# Patient Record
Sex: FEMALE | Race: WHITE | NOT HISPANIC OR LATINO | Employment: FULL TIME | URBAN - NONMETROPOLITAN AREA
[De-identification: names, ages, dates, MRNs, and addresses within clinical notes are randomized per-mention and may not be internally consistent; named-entity substitution may affect disease eponyms.]

---

## 2024-06-18 ENCOUNTER — APPOINTMENT (OUTPATIENT)
Dept: CT IMAGING | Facility: HOSPITAL | Age: 54
End: 2024-06-18
Payer: COMMERCIAL

## 2024-06-18 ENCOUNTER — HOSPITAL ENCOUNTER (EMERGENCY)
Facility: HOSPITAL | Age: 54
Discharge: HOME | End: 2024-06-18
Attending: STUDENT IN AN ORGANIZED HEALTH CARE EDUCATION/TRAINING PROGRAM
Payer: COMMERCIAL

## 2024-06-18 VITALS
DIASTOLIC BLOOD PRESSURE: 76 MMHG | TEMPERATURE: 98 F | WEIGHT: 168 LBS | SYSTOLIC BLOOD PRESSURE: 110 MMHG | BODY MASS INDEX: 29.77 KG/M2 | RESPIRATION RATE: 16 BRPM | OXYGEN SATURATION: 98 % | HEART RATE: 71 BPM | HEIGHT: 63 IN

## 2024-06-18 DIAGNOSIS — N23 RENAL COLIC, BILATERAL: Primary | ICD-10-CM

## 2024-06-18 LAB
ALBUMIN SERPL-MCNC: 3.6 G/DL (ref 3.5–5.2)
ALBUMIN/GLOB SERPL: 1.3 G/DL
ALP SERPL-CCNC: 62 U/L (ref 39–117)
ALT SERPL W P-5'-P-CCNC: 29 U/L (ref 1–33)
ANION GAP SERPL CALCULATED.3IONS-SCNC: 7.9 MMOL/L (ref 5–15)
AST SERPL-CCNC: 29 U/L (ref 1–32)
BACTERIA UR QL AUTO: ABNORMAL /HPF
BASOPHILS # BLD AUTO: 0.03 10*3/MM3 (ref 0–0.2)
BASOPHILS NFR BLD AUTO: 0.5 % (ref 0–1.5)
BILIRUB SERPL-MCNC: 0.4 MG/DL (ref 0–1.2)
BILIRUB UR QL STRIP: ABNORMAL
BUN SERPL-MCNC: 11 MG/DL (ref 6–20)
BUN/CREAT SERPL: 12.5 (ref 7–25)
CALCIUM SPEC-SCNC: 9.4 MG/DL (ref 8.6–10.5)
CHLORIDE SERPL-SCNC: 109 MMOL/L (ref 98–107)
CLARITY UR: CLEAR
CO2 SERPL-SCNC: 25.1 MMOL/L (ref 22–29)
COLOR UR: ABNORMAL
CREAT SERPL-MCNC: 0.88 MG/DL (ref 0.57–1)
CRP SERPL-MCNC: <0.3 MG/DL (ref 0–0.5)
D-LACTATE SERPL-SCNC: 0.8 MMOL/L (ref 0.5–2)
DEPRECATED RDW RBC AUTO: 44.9 FL (ref 37–54)
EGFRCR SERPLBLD CKD-EPI 2021: 78.7 ML/MIN/1.73
EOSINOPHIL # BLD AUTO: 0.07 10*3/MM3 (ref 0–0.4)
EOSINOPHIL NFR BLD AUTO: 1.2 % (ref 0.3–6.2)
ERYTHROCYTE [DISTWIDTH] IN BLOOD BY AUTOMATED COUNT: 12.6 % (ref 12.3–15.4)
ERYTHROCYTE [SEDIMENTATION RATE] IN BLOOD: 2 MM/HR (ref 0–30)
GLOBULIN UR ELPH-MCNC: 2.7 GM/DL
GLUCOSE SERPL-MCNC: 97 MG/DL (ref 65–99)
GLUCOSE UR STRIP-MCNC: NEGATIVE MG/DL
HCT VFR BLD AUTO: 46.7 % (ref 34–46.6)
HGB BLD-MCNC: 15 G/DL (ref 12–15.9)
HGB UR QL STRIP.AUTO: NEGATIVE
HOLD SPECIMEN: NORMAL
HOLD SPECIMEN: NORMAL
HYALINE CASTS UR QL AUTO: ABNORMAL /LPF
IMM GRANULOCYTES # BLD AUTO: 0.01 10*3/MM3 (ref 0–0.05)
IMM GRANULOCYTES NFR BLD AUTO: 0.2 % (ref 0–0.5)
KETONES UR QL STRIP: NEGATIVE
LEUKOCYTE ESTERASE UR QL STRIP.AUTO: ABNORMAL
LYMPHOCYTES # BLD AUTO: 1.77 10*3/MM3 (ref 0.7–3.1)
LYMPHOCYTES NFR BLD AUTO: 31.5 % (ref 19.6–45.3)
MCH RBC QN AUTO: 31.1 PG (ref 26.6–33)
MCHC RBC AUTO-ENTMCNC: 32.1 G/DL (ref 31.5–35.7)
MCV RBC AUTO: 96.7 FL (ref 79–97)
MONOCYTES # BLD AUTO: 0.52 10*3/MM3 (ref 0.1–0.9)
MONOCYTES NFR BLD AUTO: 9.3 % (ref 5–12)
NEUTROPHILS NFR BLD AUTO: 3.22 10*3/MM3 (ref 1.7–7)
NEUTROPHILS NFR BLD AUTO: 57.3 % (ref 42.7–76)
NITRITE UR QL STRIP: POSITIVE
NRBC BLD AUTO-RTO: 0 /100 WBC (ref 0–0.2)
PH UR STRIP.AUTO: 5.5 [PH] (ref 5–8)
PLATELET # BLD AUTO: 212 10*3/MM3 (ref 140–450)
PMV BLD AUTO: 9.8 FL (ref 6–12)
POTASSIUM SERPL-SCNC: 3.9 MMOL/L (ref 3.5–5.2)
PROT SERPL-MCNC: 6.3 G/DL (ref 6–8.5)
PROT UR QL STRIP: ABNORMAL
RBC # BLD AUTO: 4.83 10*6/MM3 (ref 3.77–5.28)
RBC # UR STRIP: ABNORMAL /HPF
REF LAB TEST METHOD: ABNORMAL
SODIUM SERPL-SCNC: 142 MMOL/L (ref 136–145)
SP GR UR STRIP: 1.01 (ref 1–1.03)
SQUAMOUS #/AREA URNS HPF: ABNORMAL /HPF
UROBILINOGEN UR QL STRIP: ABNORMAL
WBC # UR STRIP: ABNORMAL /HPF
WBC NRBC COR # BLD AUTO: 5.62 10*3/MM3 (ref 3.4–10.8)
WHOLE BLOOD HOLD COAG: NORMAL
WHOLE BLOOD HOLD SPECIMEN: NORMAL

## 2024-06-18 PROCEDURE — 80053 COMPREHEN METABOLIC PANEL: CPT

## 2024-06-18 PROCEDURE — 96375 TX/PRO/DX INJ NEW DRUG ADDON: CPT

## 2024-06-18 PROCEDURE — 25810000003 SODIUM CHLORIDE 0.9 % SOLUTION

## 2024-06-18 PROCEDURE — 74176 CT ABD & PELVIS W/O CONTRAST: CPT

## 2024-06-18 PROCEDURE — 74176 CT ABD & PELVIS W/O CONTRAST: CPT | Performed by: RADIOLOGY

## 2024-06-18 PROCEDURE — 36415 COLL VENOUS BLD VENIPUNCTURE: CPT

## 2024-06-18 PROCEDURE — 81001 URINALYSIS AUTO W/SCOPE: CPT

## 2024-06-18 PROCEDURE — 85025 COMPLETE CBC W/AUTO DIFF WBC: CPT

## 2024-06-18 PROCEDURE — 99284 EMERGENCY DEPT VISIT MOD MDM: CPT

## 2024-06-18 PROCEDURE — 96374 THER/PROPH/DIAG INJ IV PUSH: CPT

## 2024-06-18 PROCEDURE — 25010000002 KETOROLAC TROMETHAMINE PER 15 MG

## 2024-06-18 PROCEDURE — 85652 RBC SED RATE AUTOMATED: CPT

## 2024-06-18 PROCEDURE — 83605 ASSAY OF LACTIC ACID: CPT

## 2024-06-18 PROCEDURE — 25010000002 ONDANSETRON PER 1 MG

## 2024-06-18 PROCEDURE — 86140 C-REACTIVE PROTEIN: CPT

## 2024-06-18 RX ORDER — ONDANSETRON 2 MG/ML
4 INJECTION INTRAMUSCULAR; INTRAVENOUS ONCE
Status: COMPLETED | OUTPATIENT
Start: 2024-06-18 | End: 2024-06-18

## 2024-06-18 RX ORDER — SODIUM CHLORIDE 0.9 % (FLUSH) 0.9 %
10 SYRINGE (ML) INJECTION AS NEEDED
Status: DISCONTINUED | OUTPATIENT
Start: 2024-06-18 | End: 2024-06-18 | Stop reason: HOSPADM

## 2024-06-18 RX ORDER — KETOROLAC TROMETHAMINE 30 MG/ML
30 INJECTION, SOLUTION INTRAMUSCULAR; INTRAVENOUS ONCE
Status: COMPLETED | OUTPATIENT
Start: 2024-06-18 | End: 2024-06-18

## 2024-06-18 RX ORDER — HYDROCODONE BITARTRATE AND ACETAMINOPHEN 5; 325 MG/1; MG/1
1 TABLET ORAL ONCE
Status: COMPLETED | OUTPATIENT
Start: 2024-06-18 | End: 2024-06-18

## 2024-06-18 RX ORDER — HYDROCODONE BITARTRATE AND ACETAMINOPHEN 5; 325 MG/1; MG/1
1 TABLET ORAL EVERY 8 HOURS PRN
Qty: 6 TABLET | Refills: 0 | Status: SHIPPED | OUTPATIENT
Start: 2024-06-18 | End: 2024-06-18 | Stop reason: SDUPTHER

## 2024-06-18 RX ORDER — OXYCODONE HYDROCHLORIDE AND ACETAMINOPHEN 5; 325 MG/1; MG/1
1 TABLET ORAL EVERY 6 HOURS PRN
Qty: 12 TABLET | Refills: 0 | Status: SHIPPED | OUTPATIENT
Start: 2024-06-18 | End: 2024-06-21

## 2024-06-18 RX ORDER — HYDROCODONE BITARTRATE AND ACETAMINOPHEN 5; 325 MG/1; MG/1
1 TABLET ORAL EVERY 8 HOURS PRN
Qty: 6 TABLET | Refills: 0 | Status: CANCELLED | OUTPATIENT
Start: 2024-06-18

## 2024-06-18 RX ADMIN — SODIUM CHLORIDE 1000 ML: 9 INJECTION, SOLUTION INTRAVENOUS at 16:36

## 2024-06-18 RX ADMIN — ONDANSETRON 4 MG: 2 INJECTION INTRAMUSCULAR; INTRAVENOUS at 16:34

## 2024-06-18 RX ADMIN — KETOROLAC TROMETHAMINE 30 MG: 30 INJECTION, SOLUTION INTRAMUSCULAR; INTRAVENOUS at 16:35

## 2024-06-18 RX ADMIN — HYDROCODONE BITARTRATE AND ACETAMINOPHEN 1 TABLET: 5; 325 TABLET ORAL at 18:21

## 2024-06-18 NOTE — ED PROVIDER NOTES
Subjective   History of Present Illness  Patient is a 54-year-old female with no significant past medical history.  Patient presents with complaints of right-sided flank pain that began Saturday with radiation to now the left side.  Patient reports pain began higher on her flank and has now radiated to lower hips.  Patient reports nausea but denies any vomiting, abdominal pain, or any fever.  Patient reports she does have a history of kidney stones.  [facility] with [facility].  Patient reports that she was diagnosed based on the fact that she had blood in her urine.  Patient is alert and oriented to all questions appropriately.  Patient presents private vehicle with family at bedside.        Review of Systems   Constitutional: Negative.  Negative for fever.   HENT: Negative.     Respiratory: Negative.     Cardiovascular: Negative.  Negative for chest pain.   Gastrointestinal: Negative.  Negative for abdominal pain.   Endocrine: Negative.    Genitourinary:  Positive for flank pain. Negative for dysuria.   Skin: Negative.    Neurological: Negative.    Psychiatric/Behavioral: Negative.     All other systems reviewed and are negative.      No past medical history on file.    Allergies   Allergen Reactions    Avelox [Moxifloxacin] Hives    Morphine Rash and GI Intolerance    Synthroid [Levothyroxine] Rash       No past surgical history on file.    No family history on file.    Social History     Socioeconomic History    Marital status:            Objective   Physical Exam  Vitals and nursing note reviewed.   Constitutional:       General: She is not in acute distress.     Appearance: She is well-developed. She is not diaphoretic.   HENT:      Head: Normocephalic and atraumatic.      Right Ear: External ear normal.      Left Ear: External ear normal.      Nose: Nose normal.   Eyes:      Conjunctiva/sclera: Conjunctivae normal.      Pupils: Pupils are  equal, round, and reactive to light.   Neck:      Vascular: No JVD.      Trachea: No tracheal deviation.   Cardiovascular:      Rate and Rhythm: Normal rate and regular rhythm.      Heart sounds: Normal heart sounds. No murmur heard.  Pulmonary:      Effort: Pulmonary effort is normal. No respiratory distress.      Breath sounds: Normal breath sounds. No wheezing.   Abdominal:      General: Bowel sounds are normal.      Palpations: Abdomen is soft.      Tenderness: There is no abdominal tenderness. There is right CVA tenderness and left CVA tenderness.   Musculoskeletal:         General: No deformity. Normal range of motion.      Cervical back: Normal range of motion and neck supple.   Skin:     General: Skin is warm and dry.      Coloration: Skin is not pale.      Findings: No erythema or rash.   Neurological:      Mental Status: She is alert and oriented to person, place, and time.      Cranial Nerves: No cranial nerve deficit.   Psychiatric:         Behavior: Behavior normal.         Thought Content: Thought content normal.       Results for orders placed or performed during the hospital encounter of 06/18/24   Comprehensive Metabolic Panel    Specimen: Blood   Result Value Ref Range    Glucose 97 65 - 99 mg/dL    BUN 11 6 - 20 mg/dL    Creatinine 0.88 0.57 - 1.00 mg/dL    Sodium 142 136 - 145 mmol/L    Potassium 3.9 3.5 - 5.2 mmol/L    Chloride 109 (H) 98 - 107 mmol/L    CO2 25.1 22.0 - 29.0 mmol/L    Calcium 9.4 8.6 - 10.5 mg/dL    Total Protein 6.3 6.0 - 8.5 g/dL    Albumin 3.6 3.5 - 5.2 g/dL    ALT (SGPT) 29 1 - 33 U/L    AST (SGOT) 29 1 - 32 U/L    Alkaline Phosphatase 62 39 - 117 U/L    Total Bilirubin 0.4 0.0 - 1.2 mg/dL    Globulin 2.7 gm/dL    A/G Ratio 1.3 g/dL    BUN/Creatinine Ratio 12.5 7.0 - 25.0    Anion Gap 7.9 5.0 - 15.0 mmol/L    eGFR 78.7 >60.0 mL/min/1.73   Urinalysis With Culture If Indicated - Urine, Clean Catch    Specimen: Urine, Clean Catch   Result Value Ref Range    Color, UA Orange (A)  Yellow, Straw    Appearance, UA Clear Clear    pH, UA 5.5 5.0 - 8.0    Specific Gravity, UA 1.009 1.005 - 1.030    Glucose, UA Negative Negative    Ketones, UA Negative Negative    Bilirubin, UA Small (1+) (A) Negative    Blood, UA Negative Negative    Protein, UA Trace (A) Negative    Leuk Esterase, UA Small (1+) (A) Negative    Nitrite, UA Positive (A) Negative    Urobilinogen, UA 1.0 E.U./dL 0.2 - 1.0 E.U./dL   Lactic Acid, Plasma    Specimen: Blood   Result Value Ref Range    Lactate 0.8 0.5 - 2.0 mmol/L   C-reactive Protein    Specimen: Blood   Result Value Ref Range    C-Reactive Protein <0.30 0.00 - 0.50 mg/dL   Sedimentation Rate    Specimen: Blood   Result Value Ref Range    Sed Rate 2 0 - 30 mm/hr   CBC Auto Differential    Specimen: Blood   Result Value Ref Range    WBC 5.62 3.40 - 10.80 10*3/mm3    RBC 4.83 3.77 - 5.28 10*6/mm3    Hemoglobin 15.0 12.0 - 15.9 g/dL    Hematocrit 46.7 (H) 34.0 - 46.6 %    MCV 96.7 79.0 - 97.0 fL    MCH 31.1 26.6 - 33.0 pg    MCHC 32.1 31.5 - 35.7 g/dL    RDW 12.6 12.3 - 15.4 %    RDW-SD 44.9 37.0 - 54.0 fl    MPV 9.8 6.0 - 12.0 fL    Platelets 212 140 - 450 10*3/mm3    Neutrophil % 57.3 42.7 - 76.0 %    Lymphocyte % 31.5 19.6 - 45.3 %    Monocyte % 9.3 5.0 - 12.0 %    Eosinophil % 1.2 0.3 - 6.2 %    Basophil % 0.5 0.0 - 1.5 %    Immature Grans % 0.2 0.0 - 0.5 %    Neutrophils, Absolute 3.22 1.70 - 7.00 10*3/mm3    Lymphocytes, Absolute 1.77 0.70 - 3.10 10*3/mm3    Monocytes, Absolute 0.52 0.10 - 0.90 10*3/mm3    Eosinophils, Absolute 0.07 0.00 - 0.40 10*3/mm3    Basophils, Absolute 0.03 0.00 - 0.20 10*3/mm3    Immature Grans, Absolute 0.01 0.00 - 0.05 10*3/mm3    nRBC 0.0 0.0 - 0.2 /100 WBC   Urinalysis, Microscopic Only - Urine, Clean Catch    Specimen: Urine, Clean Catch   Result Value Ref Range    RBC, UA 0-2 None Seen, 0-2 /HPF    WBC, UA 3-5 (A) None Seen, 0-2 /HPF    Bacteria, UA None Seen None Seen /HPF    Squamous Epithelial Cells, UA 0-2 None Seen, 0-2 /HPF     Hyaline Casts, UA None Seen None Seen /LPF    Methodology Automated Microscopy    Green Top (Gel)   Result Value Ref Range    Extra Tube Hold for add-ons.    Lavender Top   Result Value Ref Range    Extra Tube hold for add-on    Gold Top - SST   Result Value Ref Range    Extra Tube Hold for add-ons.    Light Blue Top   Result Value Ref Range    Extra Tube Hold for add-ons.      CT Abdomen Pelvis Stone Protocol    Result Date: 6/18/2024  Bilateral calyceal stones.  No hydronephrosis.     [id number]  ZIP Code [address].        This report was finalized on 6/18/2024 5:53 PM by Dr. Syeda Gaona MD.         Procedures           ED Course                                             Medical Decision Making  Patient is a 54-year-old female with no significant past medical history.  Patient presents with complaints of right-sided flank pain that began Saturday with radiation to now the left side.  Patient reports pain began higher on her flank and has now radiated to lower hips.  Patient reports nausea but denies any vomiting, abdominal pain, or any fever.  Patient reports she does have a history of kidney stones.  [facility] and [facility] with [facility].  Patient reports that she was diagnosed based on the fact that she had blood in her urine.  Patient is alert and oriented to all questions appropriately.  Patient presents private vehicle with family at bedside.    Amount and/or Complexity of Data Reviewed  Labs: ordered.  Radiology: ordered.    Risk  Prescription drug management.        Final diagnoses:   Renal colic, bilateral       ED Disposition  ED Disposition       ED Disposition   Discharge    Condition   Stable    Comment   --               Kenneth Hopkins, ADAN  [address] [address] [address]  [phone]    In 1 day  Follow-up at scheduled appointment on 6/19 at 930         Medication List        New Prescriptions      oxyCODONE-acetaminophen 5-325 MG per  tablet  Commonly known as: PERCOCET  Take 1 tablet by mouth Every 6 (Six) Hours As Needed for Severe Pain for up to 3 days.               Where to Get Your Medications        These medications were sent to [id number] - [place], [place] - [address] AT [facility] - [phone] [facility] [phone] FX  [address], [place] [facility] [address]      Phone: [phone]   oxyCODONE-acetaminophen 5-325 MG per tablet            Taya Nelson, APRN  06/18/24 1314

## 2024-06-19 ENCOUNTER — OFFICE VISIT (OUTPATIENT)
Dept: UROLOGY | Facility: CLINIC | Age: 54
End: 2024-06-19
Payer: COMMERCIAL

## 2024-06-19 VITALS
WEIGHT: 176.8 LBS | SYSTOLIC BLOOD PRESSURE: 123 MMHG | DIASTOLIC BLOOD PRESSURE: 71 MMHG | HEART RATE: 76 BPM | HEIGHT: 63 IN | BODY MASS INDEX: 31.33 KG/M2

## 2024-06-19 DIAGNOSIS — N20.0 BILATERAL KIDNEY STONES: Primary | ICD-10-CM

## 2024-06-19 DIAGNOSIS — R35.0 FREQUENCY OF MICTURITION: ICD-10-CM

## 2024-06-19 RX ORDER — KETOROLAC TROMETHAMINE 10 MG/1
10 TABLET, FILM COATED ORAL EVERY 6 HOURS PRN
COMMUNITY
Start: 2024-06-15 | End: 2024-06-20

## 2024-06-19 RX ORDER — PHENAZOPYRIDINE HYDROCHLORIDE 200 MG/1
200 TABLET, FILM COATED ORAL 3 TIMES DAILY PRN
Qty: 30 TABLET | Refills: 1 | Status: SHIPPED | OUTPATIENT
Start: 2024-06-19

## 2024-06-19 RX ORDER — PHENAZOPYRIDINE HYDROCHLORIDE 200 MG/1
200 TABLET, FILM COATED ORAL 3 TIMES DAILY PRN
COMMUNITY
Start: 2024-06-15 | End: 2024-06-19 | Stop reason: SDUPTHER

## 2024-06-19 RX ORDER — TAMSULOSIN HYDROCHLORIDE 0.4 MG/1
0.4 CAPSULE ORAL DAILY
COMMUNITY
Start: 2024-06-15 | End: 2024-06-22

## 2024-06-19 NOTE — PROGRESS NOTES
"Chief Complaint:    Chief Complaint   Patient presents with    Kidney Stones       Vital Signs:   /71 (BP Location: Right arm, Patient Position: Sitting, Cuff Size: Adult)   Pulse 76   Ht 158.8 cm (62.52\")   Wt 80.2 kg (176 lb 12.8 oz)   BMI 31.80 kg/m²   Body mass index is 31.8 kg/m².      HPI:  Dina Dias is a 54 y.o. female who presents today for follow up    History of Present Illness  Ms. Cardenas presents to the clinic for emergency department follow-up.  She presented to the emergency department yesterday secondary to right-sided back and low back pain.  She has a past medical history significant for kidney stones.  She states she is underwent surgery for kidney stone removals previously.  Her last surgical intervention was in June 2023.  She reports that she was seen in [place] ER secondary to increasing pain and underwent a right uteroscopy with holmium laser lithotripsy and stent placement.  Her stent was attached by lanyard and she was discharged home.  She reports roughly 24 hours later she began to have significant severe pain on the right side with chills and went to [facility] for immediate evaluation.  Urology was consulted at the time of the evaluation and the stent was removed via lanyard.  She recently presented to the emergency department yesterday at [facility] and had a CT scan of the abdomen pelvis completed at that time that showed bilateral intrarenal calculi with 1 in each kidney.  Her right kidney stone measured 5 mm in size and the left kidney stone measured 3 mm in size.  Her bladder was unremarkable and there was no concerns of hydronephrosis.  CMP showed normal GFR and creatinine.  White cell count was normal.  Her urine did show concerns of infection however patient has been on Pyridium.  She reports that back pain has improved since continue with Pyridium and Flomax.  She is desirous to undergo surgery for removal of her right-sided kidney " stone.      Past Medical History:  History reviewed. No pertinent past medical history.    Current Meds:  Current Outpatient Medications   Medication Sig Dispense Refill    ketorolac (TORADOL) 10 MG tablet Take 1 tablet by mouth Every 6 (Six) Hours As Needed.      oxyCODONE-acetaminophen (PERCOCET) 5-325 MG per tablet Take 1 tablet by mouth Every 6 (Six) Hours As Needed for Severe Pain for up to 3 days. 12 tablet 0    phenazopyridine (PYRIDIUM) 200 MG tablet Take 1 tablet by mouth 3 (Three) Times a Day As Needed for Dysuria or Bladder Spasms. 30 tablet 1    tamsulosin (FLOMAX) 0.4 MG capsule 24 hr capsule Take 1 capsule by mouth Daily.       No current facility-administered medications for this visit.        Allergies:   Allergies   Allergen Reactions    Avelox [Moxifloxacin] Hives    Morphine Rash and GI Intolerance    Synthroid [Levothyroxine] Rash        Past Surgical History:  History reviewed. No pertinent surgical history.    Social History:  Social History     Socioeconomic History    Marital status:    Tobacco Use    Smoking status: Never   Vaping Use    Vaping status: Never Used   Substance and Sexual Activity    Alcohol use: Defer    Drug use: Defer    Sexual activity: Defer       Family History:  History reviewed. No pertinent family history.    Review of Systems:  Review of Systems   Constitutional:  Positive for chills.   Gastrointestinal:  Positive for nausea.   Genitourinary:  Positive for flank pain, frequency and urgency. Negative for hematuria.       Physical Exam:  Physical Exam  Constitutional:       General: She is not in acute distress.     Appearance: Normal appearance.   HENT:      Head: Normocephalic and atraumatic.      Nose: Nose normal.      Mouth/Throat:      Mouth: Mucous membranes are moist.   Eyes:      Conjunctiva/sclera: Conjunctivae normal.   Cardiovascular:      Rate and Rhythm: Normal rate and regular rhythm.      Pulses: Normal pulses.      Heart sounds: Normal heart  sounds.   Pulmonary:      Effort: Pulmonary effort is normal.      Breath sounds: Normal breath sounds.   Abdominal:      General: Bowel sounds are normal.      Palpations: Abdomen is soft.   Musculoskeletal:         General: Normal range of motion.      Cervical back: Normal range of motion.   Skin:     General: Skin is warm.   Neurological:      General: No focal deficit present.      Mental Status: She is alert and oriented to person, place, and time.   Psychiatric:         Mood and Affect: Mood normal.         Behavior: Behavior normal.         Thought Content: Thought content normal.         Judgment: Judgment normal.                 Recent Image (CT and/or KUB):   CT Abdomen and Pelvis: No results found for this or any previous visit.     CT Stone Protocol: Results for orders placed during the hospital encounter of 06/18/24    CT Abdomen Pelvis Stone Protocol    Narrative  VERIFICATION OBSERVER NAME: Syeda Gaona MD.      Technique: Axial images were obtained along with coronal and sagittal  reconstruction. DLP in mGycm reported in the EMR records. Dose lowering  technique: Automated exposure control. Data included in the medical  records.    HISTORY/COMPARISON/FINDINGS:    Comparison: None.    History / Findings: Renal stones.    Liver, spleen and gallbladder appeared normal.  Post cholecystectomy.  3  mm calyceal stone upper pole LEFT kidney.  5 mm calyceal stone lower  pole RIGHT kidney.  No hydronephrosis.  No free air, free fluid or evidence of bowel obstruction.  Normal  appendix.  Female pelvic organs are normal.  There is intrauterine device noted.  Urinary bladder is unremarkable.  No evidence of diverticulitis.  There are scattered diverticula.    Impression  Bilateral calyceal stones.  No hydronephrosis.          [facility]    ZIP Code [address].                This report was finalized on 6/18/2024 5:53 PM by Dr. Syeda Gaona MD.     KUB: No results found for this or any previous visit.        Labs:  Brief Urine Lab Results  (Last result in the past 365 days)        Color   Clarity   Blood   Leuk Est   Nitrite   Protein   CREAT   Urine HCG        06/18/24 1616 Orange  Comment: Dipstick results may be inaccurate due to color interference.       Clear   Negative   Small (1+)   Positive   Trace                 Admission on 06/18/2024, Discharged on 06/18/2024   Component Date Value Ref Range Status    Glucose 06/18/2024 97  65 - 99 mg/dL Final    BUN 06/18/2024 11  6 - 20 mg/dL Final    Creatinine 06/18/2024 0.88  0.57 - 1.00 mg/dL Final    Sodium 06/18/2024 142  136 - 145 mmol/L Final    Potassium 06/18/2024 3.9  3.5 - 5.2 mmol/L Final    Chloride 06/18/2024 109 (H)  98 - 107 mmol/L Final    CO2 06/18/2024 25.1  22.0 - 29.0 mmol/L Final    Calcium 06/18/2024 9.4  8.6 - 10.5 mg/dL Final    Total Protein 06/18/2024 6.3  6.0 - 8.5 g/dL Final    Albumin 06/18/2024 3.6  3.5 - 5.2 g/dL Final    ALT (SGPT) 06/18/2024 29  1 - 33 U/L Final    AST (SGOT) 06/18/2024 29  1 - 32 U/L Final    Alkaline Phosphatase 06/18/2024 62  39 - 117 U/L Final    Total Bilirubin 06/18/2024 0.4  0.0 - 1.2 mg/dL Final    Globulin 06/18/2024 2.7  gm/dL Final    A/G Ratio 06/18/2024 1.3  g/dL Final    BUN/Creatinine Ratio 06/18/2024 12.5  7.0 - 25.0 Final    Anion Gap 06/18/2024 7.9  5.0 - 15.0 mmol/L Final    eGFR 06/18/2024 78.7  >60.0 mL/min/1.73 Final    Color, UA 06/18/2024 Orange (A)  Yellow, Straw Final    Dipstick results may be inaccurate due to color interference.        Appearance, UA 06/18/2024 Clear  Clear Final    pH, UA 06/18/2024 5.5  5.0 - 8.0 Final    Specific Gravity, UA 06/18/2024 1.009  1.005 - 1.030 Final    Glucose, UA 06/18/2024 Negative  Negative Final    Ketones, UA 06/18/2024 Negative  Negative Final    Bilirubin, UA 06/18/2024 Small (1+) (A)  Negative Final    Blood, UA 06/18/2024 Negative  Negative Final    Protein, UA 06/18/2024 Trace (A)  Negative Final    Leuk Esterase, UA 06/18/2024 Small (1+) (A)   Negative Final    Nitrite, UA 06/18/2024 Positive (A)  Negative Final    Urobilinogen, UA 06/18/2024 1.0 E.U./dL  0.2 - 1.0 E.U./dL Final    Lactate 06/18/2024 0.8  0.5 - 2.0 mmol/L Final    C-Reactive Protein 06/18/2024 <0.30  0.00 - 0.50 mg/dL Final    Sed Rate 06/18/2024 2  0 - 30 mm/hr Final    WBC 06/18/2024 5.62  3.40 - 10.80 10*3/mm3 Final    RBC 06/18/2024 4.83  3.77 - 5.28 10*6/mm3 Final    Hemoglobin 06/18/2024 15.0  12.0 - 15.9 g/dL Final    Hematocrit 06/18/2024 46.7 (H)  34.0 - 46.6 % Final    MCV 06/18/2024 96.7  79.0 - 97.0 fL Final    MCH 06/18/2024 31.1  26.6 - 33.0 pg Final    MCHC 06/18/2024 32.1  31.5 - 35.7 g/dL Final    RDW 06/18/2024 12.6  12.3 - 15.4 % Final    RDW-SD 06/18/2024 44.9  37.0 - 54.0 fl Final    MPV 06/18/2024 9.8  6.0 - 12.0 fL Final    Platelets 06/18/2024 212  140 - 450 10*3/mm3 Final    Neutrophil % 06/18/2024 57.3  42.7 - 76.0 % Final    Lymphocyte % 06/18/2024 31.5  19.6 - 45.3 % Final    Monocyte % 06/18/2024 9.3  5.0 - 12.0 % Final    Eosinophil % 06/18/2024 1.2  0.3 - 6.2 % Final    Basophil % 06/18/2024 0.5  0.0 - 1.5 % Final    Immature Grans % 06/18/2024 0.2  0.0 - 0.5 % Final    Neutrophils, Absolute 06/18/2024 3.22  1.70 - 7.00 10*3/mm3 Final    Lymphocytes, Absolute 06/18/2024 1.77  0.70 - 3.10 10*3/mm3 Final    Monocytes, Absolute 06/18/2024 0.52  0.10 - 0.90 10*3/mm3 Final    Eosinophils, Absolute 06/18/2024 0.07  0.00 - 0.40 10*3/mm3 Final    Basophils, Absolute 06/18/2024 0.03  0.00 - 0.20 10*3/mm3 Final    Immature Grans, Absolute 06/18/2024 0.01  0.00 - 0.05 10*3/mm3 Final    nRBC 06/18/2024 0.0  0.0 - 0.2 /100 WBC Final    Extra Tube 06/18/2024 Hold for add-ons.   Final    Auto resulted.    Extra Tube 06/18/2024 hold for add-on   Final    Auto resulted    Extra Tube 06/18/2024 Hold for add-ons.   Final    Auto resulted.    Extra Tube 06/18/2024 Hold for add-ons.   Final    Auto resulted    RBC, UA 06/18/2024 0-2  None Seen, 0-2 /HPF Final    WBC, UA  06/18/2024 3-5 (A)  None Seen, 0-2 /HPF Final    Urine culture not indicated.    Bacteria, UA 06/18/2024 None Seen  None Seen /HPF Final    Squamous Epithelial Cells, UA 06/18/2024 0-2  None Seen, 0-2 /HPF Final    Hyaline Casts, UA 06/18/2024 None Seen  None Seen /LPF Final    Methodology 06/18/2024 Automated Microscopy   Final        Procedure: None  Procedures     I have reviewed and agree with the above PMH, PSH, FMH, social history, medications, allergies, and labs.     Assessment/Plan:   Problem List Items Addressed This Visit       Bilateral kidney stones - Primary    Relevant Medications    phenazopyridine (PYRIDIUM) 200 MG tablet    Other Relevant Orders    Case Request (Completed)     Other Visit Diagnoses       Frequency of micturition                Health Maintenance:   Health Maintenance Due   Topic Date Due    BMI FOLLOWUP  Never done    TDAP/TD VACCINES (1 - Tdap) Never done    ZOSTER VACCINE (1 of 2) Never done    COVID-19 Vaccine (4 - 2023-24 season) 09/01/2023    HEPATITIS C SCREENING  Never done    ANNUAL PHYSICAL  Never done    PAP SMEAR  Never done        Smoking Counseling: Never smoked.  Never used smokeless tobacco.    Urine Incontinence: Patient reports that she is not currently experiencing any symptoms of urinary incontinence.    Patient was given instructions and counseling regarding her condition or for health maintenance advice. Please see specific information pulled into the AVS if appropriate.    Patient Education:   Renal calculus - It was discussed with the patient the presence of a stone. We discussed the various therapeutic options available including percutaneous nephrostolithotomy, ureteroscopy and extracorporeal shockwave  lithotripsy.  We discussed the risks of lithotripsy including the passage of stones leading to a 3% chance of Steinstrasse or a large string of stones in the distal ureter. In this incidence the patient was informed that a ureteroscopy is indicated for  obstructing fragments.  Patient was informed of an extremely rare incidence of renal hematoma and the significance of this.  Patient was educated on percutaneous nephrostolithotomy and its use as well as the risks and benefits such as the need for postoperative hospitalization, and the risk of damage to the kidney and the remote risk of a nephrectomy.  We also discussed the use of ureteroscopy in the upper tracts and its decreased success rate to completely remove the stones likely causing stent placement leading to an additional procedure for removal.  We discussed the absolute relative indicators for intervention including the presence of sepsis and uncontrollable pain leading to need for urgent intervention.  We discussed placement of a stent if indicated and the management of the stent as well.  Patient desires to undergo surgery for stone removal.  Will get her scheduled for a right extracorporal shockwave lithotripsy on 6/28/2024.  Discussed the risk and benefits of this procedure in detail as above.  I will send in Pyridium for him to take as needed 3 times daily.  Advised her to continue with Flomax.  She verbalized understanding.    Visit Diagnoses:    ICD-10-CM ICD-9-CM   1. Bilateral kidney stones  N20.0 592.0   2. Frequency of micturition  R35.0 788.41       Meds Ordered During Visit:  New Medications Ordered This Visit   Medications    phenazopyridine (PYRIDIUM) 200 MG tablet     Sig: Take 1 tablet by mouth 3 (Three) Times a Day As Needed for Dysuria or Bladder Spasms.     Dispense:  30 tablet     Refill:  1       Follow Up Appointment: Right [facility] on 6/28/2024  No follow-ups on file.      This document has been electronically signed by Kenneth Hopkins PA-C   June 19, 2024 09:59 EDT    Part of this note may be an electronic transcription/translation of spoken language to printed text using the Dragon Dictation System.

## 2024-06-19 NOTE — H&P (VIEW-ONLY)
"Chief Complaint:    Chief Complaint   Patient presents with    Kidney Stones       Vital Signs:   /71 (BP Location: Right arm, Patient Position: Sitting, Cuff Size: Adult)   Pulse 76   Ht 158.8 cm (62.52\")   Wt 80.2 kg (176 lb 12.8 oz)   BMI 31.80 kg/m²   Body mass index is 31.8 kg/m².      HPI:  Dina Dias is a 54 y.o. female who presents today for follow up    History of Present Illness  Ms. Cardenas presents to the clinic for emergency department follow-up.  She presented to the emergency department yesterday secondary to right-sided back and low back pain.  She has a past medical history significant for kidney stones.  She states she is underwent surgery for kidney stone removals previously.  Her last surgical intervention was in June 2023.  She reports that she was seen in [place] [facility] secondary to increasing pain and underwent a right uteroscopy with holmium laser lithotripsy and stent placement.  Her stent was attached by lanyard and she was discharged home.  She reports roughly 24 hours later she began to have significant severe pain on the right side with chills and went to [facility] for immediate evaluation.  Urology was consulted at the time of the evaluation and the stent was removed via lanyard.  She recently presented to the emergency department yesterday at [facility] and had a CT scan of the abdomen pelvis completed at that time that showed bilateral intrarenal calculi with 1 in each kidney.  Her right kidney stone measured 5 mm in size and the left kidney stone measured 3 mm in size.  Her bladder was unremarkable and there was no concerns of hydronephrosis.  CMP showed normal GFR and creatinine.  White cell count was normal.  Her urine did show concerns of infection however patient has been on Pyridium.  She reports that back pain has improved since continue with Pyridium and Flomax.  She is desirous to undergo surgery for removal of her right-sided kidney " stone.      Past Medical History:  History reviewed. No pertinent past medical history.    Current Meds:  Current Outpatient Medications   Medication Sig Dispense Refill    ketorolac (TORADOL) 10 MG tablet Take 1 tablet by mouth Every 6 (Six) Hours As Needed.      oxyCODONE-acetaminophen (PERCOCET) 5-325 MG per tablet Take 1 tablet by mouth Every 6 (Six) Hours As Needed for Severe Pain for up to 3 days. 12 tablet 0    phenazopyridine (PYRIDIUM) 200 MG tablet Take 1 tablet by mouth 3 (Three) Times a Day As Needed for Dysuria or Bladder Spasms. 30 tablet 1    tamsulosin (FLOMAX) 0.4 MG capsule 24 hr capsule Take 1 capsule by mouth Daily.       No current facility-administered medications for this visit.        Allergies:   Allergies   Allergen Reactions    Avelox [Moxifloxacin] Hives    Morphine Rash and GI Intolerance    Synthroid [Levothyroxine] Rash        Past Surgical History:  History reviewed. No pertinent surgical history.    Social History:  Social History     Socioeconomic History    Marital status:    Tobacco Use    Smoking status: Never   Vaping Use    Vaping status: Never Used   Substance and Sexual Activity    Alcohol use: Defer    Drug use: Defer    Sexual activity: Defer       Family History:  History reviewed. No pertinent family history.    Review of Systems:  Review of Systems   Constitutional:  Positive for chills.   Gastrointestinal:  Positive for nausea.   Genitourinary:  Positive for flank pain, frequency and urgency. Negative for hematuria.       Physical Exam:  Physical Exam  Constitutional:       General: She is not in acute distress.     Appearance: Normal appearance.   HENT:      Head: Normocephalic and atraumatic.      Nose: Nose normal.      Mouth/Throat:      Mouth: Mucous membranes are moist.   Eyes:      Conjunctiva/sclera: Conjunctivae normal.   Cardiovascular:      Rate and Rhythm: Normal rate and regular rhythm.      Pulses: Normal pulses.      Heart sounds: Normal heart  sounds.   Pulmonary:      Effort: Pulmonary effort is normal.      Breath sounds: Normal breath sounds.   Abdominal:      General: Bowel sounds are normal.      Palpations: Abdomen is soft.   Musculoskeletal:         General: Normal range of motion.      Cervical back: Normal range of motion.   Skin:     General: Skin is warm.   Neurological:      General: No focal deficit present.      Mental Status: She is alert and oriented to person, place, and time.   Psychiatric:         Mood and Affect: Mood normal.         Behavior: Behavior normal.         Thought Content: Thought content normal.         Judgment: Judgment normal.                 Recent Image (CT and/or KUB):   CT Abdomen and Pelvis: No results found for this or any previous visit.     CT Stone Protocol: Results for orders placed during the hospital encounter of 06/18/24    CT Abdomen Pelvis Stone Protocol    Narrative  VERIFICATION OBSERVER NAME: Syeda Gaona MD.      Technique: Axial images were obtained along with coronal and sagittal  reconstruction. DLP in mGycm reported in the EMR records. Dose lowering  technique: Automated exposure control. Data included in the medical  records.    HISTORY/COMPARISON/FINDINGS:    Comparison: None.    History / Findings: Renal stones.    Liver, spleen and gallbladder appeared normal.  Post cholecystectomy.  3  mm calyceal stone upper pole LEFT kidney.  5 mm calyceal stone lower  pole RIGHT kidney.  No hydronephrosis.  No free air, free fluid or evidence of bowel obstruction.  Normal  appendix.  Female pelvic organs are normal.  There is intrauterine device noted.  Urinary bladder is unremarkable.  No evidence of diverticulitis.  There are scattered diverticula.    Impression  Bilateral calyceal stones.  No hydronephrosis.          [id number]    ZIP Code [address].                This report was finalized on 6/18/2024 5:53 PM by Dr. Syeda Gaona MD.     KUB: No results found for this or any previous visit.        Labs:  Brief Urine Lab Results  (Last result in the past 365 days)        Color   Clarity   Blood   Leuk Est   Nitrite   Protein   CREAT   Urine HCG        06/18/24 1616 Orange  Comment: Dipstick results may be inaccurate due to color interference.       Clear   Negative   Small (1+)   Positive   Trace                 Admission on 06/18/2024, Discharged on 06/18/2024   Component Date Value Ref Range Status    Glucose 06/18/2024 97  65 - 99 mg/dL Final    BUN 06/18/2024 11  6 - 20 mg/dL Final    Creatinine 06/18/2024 0.88  0.57 - 1.00 mg/dL Final    Sodium 06/18/2024 142  136 - 145 mmol/L Final    Potassium 06/18/2024 3.9  3.5 - 5.2 mmol/L Final    Chloride 06/18/2024 109 (H)  98 - 107 mmol/L Final    CO2 06/18/2024 25.1  22.0 - 29.0 mmol/L Final    Calcium 06/18/2024 9.4  8.6 - 10.5 mg/dL Final    Total Protein 06/18/2024 6.3  6.0 - 8.5 g/dL Final    Albumin 06/18/2024 3.6  3.5 - 5.2 g/dL Final    ALT (SGPT) 06/18/2024 29  1 - 33 U/L Final    AST (SGOT) 06/18/2024 29  1 - 32 U/L Final    Alkaline Phosphatase 06/18/2024 62  39 - 117 U/L Final    Total Bilirubin 06/18/2024 0.4  0.0 - 1.2 mg/dL Final    Globulin 06/18/2024 2.7  gm/dL Final    A/G Ratio 06/18/2024 1.3  g/dL Final    BUN/Creatinine Ratio 06/18/2024 12.5  7.0 - 25.0 Final    Anion Gap 06/18/2024 7.9  5.0 - 15.0 mmol/L Final    eGFR 06/18/2024 78.7  >60.0 mL/min/1.73 Final    Color, UA 06/18/2024 Orange (A)  Yellow, Straw Final    Dipstick results may be inaccurate due to color interference.        Appearance, UA 06/18/2024 Clear  Clear Final    pH, UA 06/18/2024 5.5  5.0 - 8.0 Final    Specific Gravity, UA 06/18/2024 1.009  1.005 - 1.030 Final    Glucose, UA 06/18/2024 Negative  Negative Final    Ketones, UA 06/18/2024 Negative  Negative Final    Bilirubin, UA 06/18/2024 Small (1+) (A)  Negative Final    Blood, UA 06/18/2024 Negative  Negative Final    Protein, UA 06/18/2024 Trace (A)  Negative Final    Leuk Esterase, UA 06/18/2024 Small (1+) (A)   Negative Final    Nitrite, UA 06/18/2024 Positive (A)  Negative Final    Urobilinogen, UA 06/18/2024 1.0 E.U./dL  0.2 - 1.0 E.U./dL Final    Lactate 06/18/2024 0.8  0.5 - 2.0 mmol/L Final    C-Reactive Protein 06/18/2024 <0.30  0.00 - 0.50 mg/dL Final    Sed Rate 06/18/2024 2  0 - 30 mm/hr Final    WBC 06/18/2024 5.62  3.40 - 10.80 10*3/mm3 Final    RBC 06/18/2024 4.83  3.77 - 5.28 10*6/mm3 Final    Hemoglobin 06/18/2024 15.0  12.0 - 15.9 g/dL Final    Hematocrit 06/18/2024 46.7 (H)  34.0 - 46.6 % Final    MCV 06/18/2024 96.7  79.0 - 97.0 fL Final    MCH 06/18/2024 31.1  26.6 - 33.0 pg Final    MCHC 06/18/2024 32.1  31.5 - 35.7 g/dL Final    RDW 06/18/2024 12.6  12.3 - 15.4 % Final    RDW-SD 06/18/2024 44.9  37.0 - 54.0 fl Final    MPV 06/18/2024 9.8  6.0 - 12.0 fL Final    Platelets 06/18/2024 212  140 - 450 10*3/mm3 Final    Neutrophil % 06/18/2024 57.3  42.7 - 76.0 % Final    Lymphocyte % 06/18/2024 31.5  19.6 - 45.3 % Final    Monocyte % 06/18/2024 9.3  5.0 - 12.0 % Final    Eosinophil % 06/18/2024 1.2  0.3 - 6.2 % Final    Basophil % 06/18/2024 0.5  0.0 - 1.5 % Final    Immature Grans % 06/18/2024 0.2  0.0 - 0.5 % Final    Neutrophils, Absolute 06/18/2024 3.22  1.70 - 7.00 10*3/mm3 Final    Lymphocytes, Absolute 06/18/2024 1.77  0.70 - 3.10 10*3/mm3 Final    Monocytes, Absolute 06/18/2024 0.52  0.10 - 0.90 10*3/mm3 Final    Eosinophils, Absolute 06/18/2024 0.07  0.00 - 0.40 10*3/mm3 Final    Basophils, Absolute 06/18/2024 0.03  0.00 - 0.20 10*3/mm3 Final    Immature Grans, Absolute 06/18/2024 0.01  0.00 - 0.05 10*3/mm3 Final    nRBC 06/18/2024 0.0  0.0 - 0.2 /100 WBC Final    Extra Tube 06/18/2024 Hold for add-ons.   Final    Auto resulted.    Extra Tube 06/18/2024 hold for add-on   Final    Auto resulted    Extra Tube 06/18/2024 Hold for add-ons.   Final    Auto resulted.    Extra Tube 06/18/2024 Hold for add-ons.   Final    Auto resulted    RBC, UA 06/18/2024 0-2  None Seen, 0-2 /HPF Final    WBC, UA  06/18/2024 3-5 (A)  None Seen, 0-2 /HPF Final    Urine culture not indicated.    Bacteria, UA 06/18/2024 None Seen  None Seen /HPF Final    Squamous Epithelial Cells, UA 06/18/2024 0-2  None Seen, 0-2 /HPF Final    Hyaline Casts, UA 06/18/2024 None Seen  None Seen /LPF Final    Methodology 06/18/2024 Automated Microscopy   Final        Procedure: None  Procedures     I have reviewed and agree with the above PMH, PSH, FMH, social history, medications, allergies, and labs.     Assessment/Plan:   Problem List Items Addressed This Visit       Bilateral kidney stones - Primary    Relevant Medications    phenazopyridine (PYRIDIUM) 200 MG tablet    Other Relevant Orders    Case Request (Completed)     Other Visit Diagnoses       Frequency of micturition                Health Maintenance:   Health Maintenance Due   Topic Date Due    BMI FOLLOWUP  Never done    TDAP/TD VACCINES (1 - Tdap) Never done    ZOSTER VACCINE (1 of 2) Never done    COVID-19 Vaccine (4 - 2023-24 season) 09/01/2023    HEPATITIS C SCREENING  Never done    ANNUAL PHYSICAL  Never done    PAP SMEAR  Never done        Smoking Counseling: Never smoked.  Never used smokeless tobacco.    Urine Incontinence: Patient reports that she is not currently experiencing any symptoms of urinary incontinence.    Patient was given instructions and counseling regarding her condition or for health maintenance advice. Please see specific information pulled into the AVS if appropriate.    Patient Education:   Renal calculus - It was discussed with the patient the presence of a stone. We discussed the various therapeutic options available including percutaneous nephrostolithotomy, ureteroscopy and extracorporeal shockwave  lithotripsy.  We discussed the risks of lithotripsy including the passage of stones leading to a 3% chance of Steinstrasse or a large string of stones in the distal ureter. In this incidence the patient was informed that a ureteroscopy is indicated for  obstructing fragments.  Patient was informed of an extremely rare incidence of renal hematoma and the significance of this.  Patient was educated on percutaneous nephrostolithotomy and its use as well as the risks and benefits such as the need for postoperative hospitalization, and the risk of damage to the kidney and the remote risk of a nephrectomy.  We also discussed the use of ureteroscopy in the upper tracts and its decreased success rate to completely remove the stones likely causing stent placement leading to an additional procedure for removal.  We discussed the absolute relative indicators for intervention including the presence of sepsis and uncontrollable pain leading to need for urgent intervention.  We discussed placement of a stent if indicated and the management of the stent as well.  Patient desires to undergo surgery for stone removal.  Will get her scheduled for a right extracorporal shockwave lithotripsy on 6/28/2024.  Discussed the risk and benefits of this procedure in detail as above.  I will send in Pyridium for him to take as needed 3 times daily.  Advised her to continue with Flomax.  She verbalized understanding.    Visit Diagnoses:    ICD-10-CM ICD-9-CM   1. Bilateral kidney stones  N20.0 592.0   2. Frequency of micturition  R35.0 788.41       Meds Ordered During Visit:  New Medications Ordered This Visit   Medications    phenazopyridine (PYRIDIUM) 200 MG tablet     Sig: Take 1 tablet by mouth 3 (Three) Times a Day As Needed for Dysuria or Bladder Spasms.     Dispense:  30 tablet     Refill:  1       Follow Up Appointment: Right [facility] on 6/28/2024  No follow-ups on file.      This document has been electronically signed by Kenneth Hopkins PA-C   June 19, 2024 09:59 EDT    Part of this note may be an electronic transcription/translation of spoken language to printed text using the Dragon Dictation System.

## 2024-06-28 ENCOUNTER — ANESTHESIA EVENT (OUTPATIENT)
Dept: PERIOP | Facility: HOSPITAL | Age: 54
End: 2024-06-28
Payer: COMMERCIAL

## 2024-06-28 ENCOUNTER — APPOINTMENT (OUTPATIENT)
Dept: GENERAL RADIOLOGY | Facility: HOSPITAL | Age: 54
End: 2024-06-28
Payer: COMMERCIAL

## 2024-06-28 ENCOUNTER — HOSPITAL ENCOUNTER (OUTPATIENT)
Facility: HOSPITAL | Age: 54
Setting detail: HOSPITAL OUTPATIENT SURGERY
Discharge: HOME | End: 2024-06-28
Attending: UROLOGY | Admitting: UROLOGY
Payer: COMMERCIAL

## 2024-06-28 ENCOUNTER — ANESTHESIA (OUTPATIENT)
Dept: PERIOP | Facility: HOSPITAL | Age: 54
End: 2024-06-28
Payer: COMMERCIAL

## 2024-06-28 VITALS
BODY MASS INDEX: 30.55 KG/M2 | HEIGHT: 62 IN | OXYGEN SATURATION: 98 % | HEART RATE: 83 BPM | RESPIRATION RATE: 18 BRPM | SYSTOLIC BLOOD PRESSURE: 108 MMHG | TEMPERATURE: 97.7 F | DIASTOLIC BLOOD PRESSURE: 71 MMHG | WEIGHT: 166 LBS

## 2024-06-28 DIAGNOSIS — N20.0 BILATERAL KIDNEY STONES: Primary | ICD-10-CM

## 2024-06-28 LAB
B-HCG UR QL: NEGATIVE
EXPIRATION DATE: NORMAL
INTERNAL NEGATIVE CONTROL: NEGATIVE
INTERNAL POSITIVE CONTROL: POSITIVE
Lab: NORMAL

## 2024-06-28 PROCEDURE — 74018 RADEX ABDOMEN 1 VIEW: CPT

## 2024-06-28 PROCEDURE — 81025 URINE PREGNANCY TEST: CPT | Performed by: ANESTHESIOLOGY

## 2024-06-28 PROCEDURE — 50590 FRAGMENTING OF KIDNEY STONE: CPT | Performed by: UROLOGY

## 2024-06-28 PROCEDURE — 25810000003 LACTATED RINGERS PER 1000 ML: Performed by: ANESTHESIOLOGY

## 2024-06-28 PROCEDURE — 25010000002 FENTANYL CITRATE (PF) 50 MCG/ML SOLUTION: Performed by: NURSE ANESTHETIST, CERTIFIED REGISTERED

## 2024-06-28 PROCEDURE — 25810000003 LACTATED RINGERS PER 1000 ML: Performed by: NURSE ANESTHETIST, CERTIFIED REGISTERED

## 2024-06-28 PROCEDURE — 25010000002 GENTAMICIN PER 80 MG: Performed by: UROLOGY

## 2024-06-28 PROCEDURE — 74018 RADEX ABDOMEN 1 VIEW: CPT | Performed by: RADIOLOGY

## 2024-06-28 PROCEDURE — 25010000002 MIDAZOLAM PER 1 MG: Performed by: NURSE ANESTHETIST, CERTIFIED REGISTERED

## 2024-06-28 PROCEDURE — 25010000002 ONDANSETRON PER 1 MG: Performed by: NURSE ANESTHETIST, CERTIFIED REGISTERED

## 2024-06-28 PROCEDURE — 25010000002 PROPOFOL 200 MG/20ML EMULSION: Performed by: NURSE ANESTHETIST, CERTIFIED REGISTERED

## 2024-06-28 RX ORDER — ONDANSETRON 2 MG/ML
INJECTION INTRAMUSCULAR; INTRAVENOUS AS NEEDED
Status: DISCONTINUED | OUTPATIENT
Start: 2024-06-28 | End: 2024-06-28 | Stop reason: SURG

## 2024-06-28 RX ORDER — IPRATROPIUM BROMIDE AND ALBUTEROL SULFATE 2.5; .5 MG/3ML; MG/3ML
3 SOLUTION RESPIRATORY (INHALATION) ONCE AS NEEDED
Status: DISCONTINUED | OUTPATIENT
Start: 2024-06-28 | End: 2024-06-28 | Stop reason: HOSPADM

## 2024-06-28 RX ORDER — FENTANYL CITRATE 50 UG/ML
50 INJECTION, SOLUTION INTRAMUSCULAR; INTRAVENOUS
Status: DISCONTINUED | OUTPATIENT
Start: 2024-06-28 | End: 2024-06-28 | Stop reason: HOSPADM

## 2024-06-28 RX ORDER — PROPOFOL 10 MG/ML
INJECTION, EMULSION INTRAVENOUS AS NEEDED
Status: DISCONTINUED | OUTPATIENT
Start: 2024-06-28 | End: 2024-06-28 | Stop reason: SURG

## 2024-06-28 RX ORDER — LIDOCAINE HYDROCHLORIDE 20 MG/ML
INJECTION, SOLUTION EPIDURAL; INFILTRATION; INTRACAUDAL; PERINEURAL AS NEEDED
Status: DISCONTINUED | OUTPATIENT
Start: 2024-06-28 | End: 2024-06-28 | Stop reason: SURG

## 2024-06-28 RX ORDER — SODIUM CHLORIDE 0.9 % (FLUSH) 0.9 %
10 SYRINGE (ML) INJECTION EVERY 12 HOURS SCHEDULED
Status: DISCONTINUED | OUTPATIENT
Start: 2024-06-28 | End: 2024-06-28 | Stop reason: HOSPADM

## 2024-06-28 RX ORDER — SODIUM CHLORIDE, SODIUM LACTATE, POTASSIUM CHLORIDE, CALCIUM CHLORIDE 600; 310; 30; 20 MG/100ML; MG/100ML; MG/100ML; MG/100ML
125 INJECTION, SOLUTION INTRAVENOUS ONCE
Status: COMPLETED | OUTPATIENT
Start: 2024-06-28 | End: 2024-06-28

## 2024-06-28 RX ORDER — KETOROLAC TROMETHAMINE 10 MG/1
10 TABLET, FILM COATED ORAL EVERY 6 HOURS PRN
COMMUNITY

## 2024-06-28 RX ORDER — SODIUM CHLORIDE, SODIUM LACTATE, POTASSIUM CHLORIDE, CALCIUM CHLORIDE 600; 310; 30; 20 MG/100ML; MG/100ML; MG/100ML; MG/100ML
100 INJECTION, SOLUTION INTRAVENOUS ONCE AS NEEDED
Status: DISCONTINUED | OUTPATIENT
Start: 2024-06-28 | End: 2024-06-28 | Stop reason: HOSPADM

## 2024-06-28 RX ORDER — MIDAZOLAM HYDROCHLORIDE 1 MG/ML
1 INJECTION INTRAMUSCULAR; INTRAVENOUS
Status: DISCONTINUED | OUTPATIENT
Start: 2024-06-28 | End: 2024-06-28 | Stop reason: HOSPADM

## 2024-06-28 RX ORDER — SODIUM CHLORIDE 9 MG/ML
40 INJECTION, SOLUTION INTRAVENOUS AS NEEDED
Status: DISCONTINUED | OUTPATIENT
Start: 2024-06-28 | End: 2024-06-28 | Stop reason: HOSPADM

## 2024-06-28 RX ORDER — SODIUM CHLORIDE, SODIUM LACTATE, POTASSIUM CHLORIDE, CALCIUM CHLORIDE 600; 310; 30; 20 MG/100ML; MG/100ML; MG/100ML; MG/100ML
INJECTION, SOLUTION INTRAVENOUS CONTINUOUS PRN
Status: DISCONTINUED | OUTPATIENT
Start: 2024-06-28 | End: 2024-06-28 | Stop reason: SURG

## 2024-06-28 RX ORDER — MEPERIDINE HYDROCHLORIDE 25 MG/ML
12.5 INJECTION INTRAMUSCULAR; INTRAVENOUS; SUBCUTANEOUS
Status: DISCONTINUED | OUTPATIENT
Start: 2024-06-28 | End: 2024-06-28 | Stop reason: HOSPADM

## 2024-06-28 RX ORDER — FENTANYL CITRATE 50 UG/ML
INJECTION, SOLUTION INTRAMUSCULAR; INTRAVENOUS AS NEEDED
Status: DISCONTINUED | OUTPATIENT
Start: 2024-06-28 | End: 2024-06-28 | Stop reason: SURG

## 2024-06-28 RX ORDER — ONDANSETRON 2 MG/ML
4 INJECTION INTRAMUSCULAR; INTRAVENOUS AS NEEDED
Status: DISCONTINUED | OUTPATIENT
Start: 2024-06-28 | End: 2024-06-28 | Stop reason: HOSPADM

## 2024-06-28 RX ORDER — FAMOTIDINE 10 MG/ML
INJECTION, SOLUTION INTRAVENOUS AS NEEDED
Status: DISCONTINUED | OUTPATIENT
Start: 2024-06-28 | End: 2024-06-28 | Stop reason: SURG

## 2024-06-28 RX ORDER — KETOROLAC TROMETHAMINE 30 MG/ML
30 INJECTION, SOLUTION INTRAMUSCULAR; INTRAVENOUS EVERY 6 HOURS PRN
Status: DISCONTINUED | OUTPATIENT
Start: 2024-06-28 | End: 2024-06-28 | Stop reason: HOSPADM

## 2024-06-28 RX ORDER — EPHEDRINE SULFATE 5 MG/ML
INJECTION INTRAVENOUS AS NEEDED
Status: DISCONTINUED | OUTPATIENT
Start: 2024-06-28 | End: 2024-06-28 | Stop reason: SURG

## 2024-06-28 RX ORDER — OXYCODONE HYDROCHLORIDE AND ACETAMINOPHEN 5; 325 MG/1; MG/1
1 TABLET ORAL ONCE AS NEEDED
Status: DISCONTINUED | OUTPATIENT
Start: 2024-06-28 | End: 2024-06-28 | Stop reason: HOSPADM

## 2024-06-28 RX ORDER — GENTAMICIN SULFATE 80 MG/100ML
80 INJECTION, SOLUTION INTRAVENOUS ONCE
Status: COMPLETED | OUTPATIENT
Start: 2024-06-28 | End: 2024-06-28

## 2024-06-28 RX ORDER — SODIUM CHLORIDE 0.9 % (FLUSH) 0.9 %
10 SYRINGE (ML) INJECTION AS NEEDED
Status: DISCONTINUED | OUTPATIENT
Start: 2024-06-28 | End: 2024-06-28 | Stop reason: HOSPADM

## 2024-06-28 RX ORDER — MIDAZOLAM HYDROCHLORIDE 1 MG/ML
INJECTION INTRAMUSCULAR; INTRAVENOUS AS NEEDED
Status: DISCONTINUED | OUTPATIENT
Start: 2024-06-28 | End: 2024-06-28 | Stop reason: SURG

## 2024-06-28 RX ORDER — HYDROCODONE BITARTRATE AND ACETAMINOPHEN 10; 325 MG/1; MG/1
1 TABLET ORAL EVERY 6 HOURS PRN
Qty: 12 TABLET | Refills: 0 | Status: SHIPPED | OUTPATIENT
Start: 2024-06-28

## 2024-06-28 RX ADMIN — LIDOCAINE HYDROCHLORIDE 60 MG: 20 INJECTION, SOLUTION EPIDURAL; INFILTRATION; INTRACAUDAL at 12:18

## 2024-06-28 RX ADMIN — SODIUM CHLORIDE, POTASSIUM CHLORIDE, SODIUM LACTATE AND CALCIUM CHLORIDE 125 ML/HR: 600; 310; 30; 20 INJECTION, SOLUTION INTRAVENOUS at 11:23

## 2024-06-28 RX ADMIN — ONDANSETRON 4 MG: 2 INJECTION INTRAMUSCULAR; INTRAVENOUS at 12:18

## 2024-06-28 RX ADMIN — FENTANYL CITRATE 100 MCG: 50 INJECTION, SOLUTION INTRAMUSCULAR; INTRAVENOUS at 12:18

## 2024-06-28 RX ADMIN — FAMOTIDINE 20 MG: 10 INJECTION, SOLUTION INTRAVENOUS at 12:16

## 2024-06-28 RX ADMIN — SODIUM CHLORIDE, POTASSIUM CHLORIDE, SODIUM LACTATE AND CALCIUM CHLORIDE: 600; 310; 30; 20 INJECTION, SOLUTION INTRAVENOUS at 12:16

## 2024-06-28 RX ADMIN — MIDAZOLAM HYDROCHLORIDE 2 MG: 1 INJECTION, SOLUTION INTRAMUSCULAR; INTRAVENOUS at 12:16

## 2024-06-28 RX ADMIN — PROPOFOL 150 MG: 10 INJECTION, EMULSION INTRAVENOUS at 12:18

## 2024-06-28 RX ADMIN — GENTAMICIN SULFATE 80 MG: 80 INJECTION, SOLUTION INTRAVENOUS at 12:26

## 2024-06-28 RX ADMIN — EPHEDRINE SULFATE 10 MG: 5 INJECTION INTRAVENOUS at 12:35

## 2024-06-28 NOTE — ANESTHESIA PROCEDURE NOTES
Airway  Urgency: elective    Date/Time: 6/28/2024 12:19 PM    General Information and Staff    Patient location during procedure: OR    Indications and Patient Condition    Preoxygenated: yes  Mask difficulty assessment: 0 - not attempted    Final Airway Details  Final airway type: supraglottic airway      Successful airway: LMA  Size 4     Number of attempts at approach: 1  Assessment: lips, teeth, and gum same as pre-op

## 2024-06-28 NOTE — ANESTHESIA POSTPROCEDURE EVALUATION
Patient: Dina Dias    Procedure Summary       Date: 06/28/24 Room / Location: [facility] OR 09 /  [facility] OR    Anesthesia Start: 1216 Anesthesia Stop: 1242    Procedure: EXTRACORPOREAL SHOCKWAVE LITHOTRIPSY (Right) Diagnosis:       Bilateral kidney stones      (Bilateral kidney stones [N20.0])    Surgeons: Alcon Bowman MD Provider: Rolando Madison MD    Anesthesia Type: general ASA Status: 2            Anesthesia Type: general    Vitals  Vitals Value Taken Time   /63 06/28/24 1256   Temp 97.8 °F (36.6 °C) 06/28/24 1243   Pulse 81 06/28/24 1259   Resp 12 06/28/24 1253   SpO2 98 % 06/28/24 1259   Vitals shown include unfiled device data.        Post Anesthesia Care and Evaluation    Patient location during evaluation: PHASE II  Patient participation: complete - patient participated  Level of consciousness: awake and alert  Pain score: 1  Pain management: adequate    Airway patency: patent  Anesthetic complications: No anesthetic complications  PONV Status: controlled  Cardiovascular status: acceptable  Respiratory status: acceptable  Hydration status: acceptable

## 2024-06-28 NOTE — ANESTHESIA PREPROCEDURE EVALUATION
Anesthesia Evaluation     Patient summary reviewed and Nursing notes reviewed   history of anesthetic complications:  PONV  NPO Solid Status: > 8 hours  NPO Liquid Status: > 8 hours           Airway   Mallampati: I  TM distance: >3 FB  Neck ROM: full  No difficulty expected  Dental    (+) poor dentition    Pulmonary - negative pulmonary ROS and normal exam   Cardiovascular - negative cardio ROS and normal exam  Exercise tolerance: good (4-7 METS)    NYHA Classification: II        Neuro/Psych- negative ROS  GI/Hepatic/Renal/Endo - negative ROS   (+) renal disease-    Musculoskeletal (-) negative ROS    Abdominal  - normal exam    Bowel sounds: normal.   Substance History - negative use     OB/GYN negative ob/gyn ROS         Other - negative ROS                         Anesthesia Plan    ASA 2     general     intravenous induction     Anesthetic plan, risks, benefits, and alternatives have been provided, discussed and informed consent has been obtained with: patient.        CODE STATUS:

## 2024-06-28 NOTE — OP NOTE
EXTRACORPOREAL SHOCKWAVE LITHOTRIPSY  Procedure Note    Dina Dias  6/28/2024    Pre-op Diagnosis:   Bilateral kidney stones [N20.0]    Post-op Diagnosis:     Post-Op Diagnosis Codes:     * Bilateral kidney stones [N20.0]    Procedure/CPT® Codes:    54-year-old white female with bilateral renal calculi and a painful right 5 mm lower pole stone.  ESWL-the patient is a candidate for extracorporeal shockwave lithotripsy.  We discussed the type of stone and the complications associated with the procedure including, but not limited to, pain in the flank, hematoma, spontaneous renal hemorrhage, inadequate fragmentation of stones, the need for passage of the stones, the need for concomitant additional procedures in the range of 24%, the risk of a distal fragment in the range of 3% requiring ureteroscopic removal, and the fact that sometimes a stent is indicated based on the size and the density of the stone as determined on the CAT scan.  Additionally, we discussed percutaneous nephrostolithotomy.  Including the mini PERC.  With its attendant risks of anesthesia bleeding infection and the fact that is a invasive procedure with the remote possibility of a nephrectomy.  We also discussed the use of ureteroscopy which is a rigid or flexible instrument placed up into the kidney to break up stones with the laser beam and very likely a postop stent and a high likelihood of additional concomitant procedures.  Following an informed consent, he was brought to the operative suite and underwent induction of general endotracheal anesthetic.  The stone was localized at F2 and a total of 2000 shockwaves was administered without complication.  There was excellent fragmentation  He was awake and alert and returned to recovery room.       Procedure(s):  EXTRACORPOREAL SHOCKWAVE LITHOTRIPSY    Surgeon(s):  Alcon Bowman MD    Anesthesia: see anesthesia record    Staff:   Circulator: Kely Juarez RN  Scrub Person:  Hafsa Sparks LPN; [employer]    Estimated Blood Loss: none  Urine Voided: * No values recorded between 6/28/2024 12:15 PM and 6/28/2024 12:40 PM *    Specimens:                None      Drains: None    Findings: Excellent fragmentation    Blood: N/A    Complications: None    Grafts and Implants: None    Alcon Bowman MD     Date: 6/28/2024  Time: 12:42 EDT

## 2025-05-19 ENCOUNTER — OFFICE VISIT (OUTPATIENT)
Dept: SURGERY | Facility: CLINIC | Age: 55
End: 2025-05-19
Payer: COMMERCIAL

## 2025-05-19 VITALS — HEIGHT: 62 IN | WEIGHT: 160.6 LBS | BODY MASS INDEX: 29.55 KG/M2

## 2025-05-19 DIAGNOSIS — R10.32 LLQ PAIN: Primary | ICD-10-CM

## 2025-05-19 DIAGNOSIS — K57.32 DIVERTICULITIS OF COLON: ICD-10-CM

## 2025-05-19 PROCEDURE — 99203 OFFICE O/P NEW LOW 30 MIN: CPT | Performed by: SURGERY

## 2025-05-19 RX ORDER — OMEPRAZOLE 40 MG/1
40 CAPSULE, DELAYED RELEASE ORAL DAILY
COMMUNITY
Start: 2025-04-29

## 2025-05-19 RX ORDER — SODIUM, POTASSIUM,MAG SULFATES 17.5-3.13G
SOLUTION, RECONSTITUTED, ORAL ORAL
Qty: 175 ML | Refills: 0 | Status: SHIPPED | OUTPATIENT
Start: 2025-05-19

## 2025-05-19 RX ORDER — SUCRALFATE 1 G/1
1 TABLET ORAL 4 TIMES DAILY
COMMUNITY
Start: 2025-04-29

## 2025-05-19 NOTE — H&P (VIEW-ONLY)
Subjective   Dina Dias is a 55 y.o. female.     Chief Complaint: history of diverticulitis and polyps    History of Present Illness She is a 56 yo who has had several colonscopies for LLQ pain. She has Celiac disease and GERD, and was also told she had polyps and diveritculosis on her last colonoscopy 5 years ago.She does have frequent LLQ pain. No bleeding.    The following portions of the patient's history were reviewed and updated as appropriate: current medications, past family history, past medical history, past social history, past surgical history and problem list.    Review of Systems   Constitutional:  Negative for activity change, appetite change, chills, fever and unexpected weight change.   HENT:  Negative for congestion, facial swelling and sore throat.    Eyes:  Negative for photophobia and visual disturbance.   Respiratory:  Negative for chest tightness, shortness of breath and wheezing.    Cardiovascular:  Negative for chest pain, palpitations and leg swelling.   Gastrointestinal:  Positive for abdominal pain, diarrhea and nausea. Negative for abdominal distention, anal bleeding, blood in stool, constipation, rectal pain and vomiting.   Endocrine: Negative for cold intolerance, heat intolerance, polydipsia and polyuria.   Genitourinary:  Negative for difficulty urinating, dysuria, flank pain and urgency.   Musculoskeletal:  Negative for back pain and myalgias.   Skin:  Negative for rash and wound.   Allergic/Immunologic: Negative for immunocompromised state.   Neurological:  Negative for dizziness, seizures, syncope, light-headedness, numbness and headaches.   Hematological:  Negative for adenopathy. Does not bruise/bleed easily.   Psychiatric/Behavioral:  Negative for behavioral problems and confusion. The patient is not nervous/anxious.        Objective   Physical Exam  Vitals reviewed.   Constitutional:       General: She is not in acute distress.     Appearance: She is well-developed. She  is not ill-appearing.   HENT:      Head: Normocephalic. No laceration. Hair is normal.      Right Ear: Hearing and ear canal normal.      Left Ear: Hearing and ear canal normal.      Nose: Nose normal.      Right Sinus: No maxillary sinus tenderness or frontal sinus tenderness.      Left Sinus: No maxillary sinus tenderness or frontal sinus tenderness.   Eyes:      General: Lids are normal.      Conjunctiva/sclera: Conjunctivae normal.      Pupils: Pupils are equal, round, and reactive to light.   Neck:      Thyroid: No thyroid mass or thyromegaly.      Vascular: No JVD.      Trachea: No tracheal tenderness or tracheal deviation.   Cardiovascular:      Rate and Rhythm: Normal rate and regular rhythm.      Heart sounds: No murmur heard.     No gallop.   Pulmonary:      Effort: Pulmonary effort is normal.      Breath sounds: Normal breath sounds. No stridor. No wheezing.   Chest:      Chest wall: No tenderness.   Abdominal:      General: Bowel sounds are normal. There is no distension.      Palpations: Abdomen is soft. There is no mass.      Tenderness: There is no abdominal tenderness. There is no guarding or rebound.      Hernia: No hernia is present.   Musculoskeletal:         General: No deformity.      Cervical back: Normal range of motion.   Lymphadenopathy:      Cervical: No cervical adenopathy.      Upper Body:      Right upper body: No supraclavicular adenopathy.      Left upper body: No supraclavicular adenopathy.   Skin:     General: Skin is warm and dry.      Coloration: Skin is not pale.      Findings: No erythema or rash.   Neurological:      Mental Status: She is alert and oriented to person, place, and time.      Motor: No abnormal muscle tone.   Psychiatric:         Behavior: Behavior normal.         Thought Content: Thought content normal.         Past Medical History:   Diagnosis Date    Celiac disease     Diverticulitis of colon     PONV (postoperative nausea and vomiting)        Family History    Problem Relation Age of Onset    Depression Mother     Depression Sister        Social History     Tobacco Use    Smoking status: Never    Smokeless tobacco: Never   Vaping Use    Vaping status: Never Used   Substance Use Topics    Alcohol use: Never    Drug use: Never       Past Surgical History:   Procedure Laterality Date    EXTRACORPOREAL SHOCK WAVE LITHOTRIPSY (ESWL) Right 6/28/2024    Procedure: EXTRACORPOREAL SHOCKWAVE LITHOTRIPSY;  Surgeon: Alcon Bowman MD;  Location: [facility];  Service: Urology;  Laterality: Right;    LAPAROSCOPIC CHOLECYSTECTOMY         Current Outpatient Medications   Medication Instructions    omeprazole (PRILOSEC) 40 mg, Daily    sucralfate (CARAFATE) 1 g, 4 Times Daily         Assessment & Plan   Diagnoses and all orders for this visit:    1. LLQ pain (Primary)    2. Diverticulitis of colon    colonoscopy             This document has been electronically signed by Karson Palacio MD   May 19, 2025 11:26 EDT

## 2025-05-19 NOTE — PROGRESS NOTES
Subjective   Dina Dias is a 55 y.o. female.     Chief Complaint: history of diverticulitis and polyps    History of Present Illness She is a 56 yo who has had several colonscopies for LLQ pain. She has Celiac disease and GERD, and was also told she had polyps and diveritculosis on her last colonoscopy 5 years ago.She does have frequent LLQ pain. No bleeding.    The following portions of the patient's history were reviewed and updated as appropriate: current medications, past family history, past medical history, past social history, past surgical history and problem list.    Review of Systems   Constitutional:  Negative for activity change, appetite change, chills, fever and unexpected weight change.   HENT:  Negative for congestion, facial swelling and sore throat.    Eyes:  Negative for photophobia and visual disturbance.   Respiratory:  Negative for chest tightness, shortness of breath and wheezing.    Cardiovascular:  Negative for chest pain, palpitations and leg swelling.   Gastrointestinal:  Positive for abdominal pain, diarrhea and nausea. Negative for abdominal distention, anal bleeding, blood in stool, constipation, rectal pain and vomiting.   Endocrine: Negative for cold intolerance, heat intolerance, polydipsia and polyuria.   Genitourinary:  Negative for difficulty urinating, dysuria, flank pain and urgency.   Musculoskeletal:  Negative for back pain and myalgias.   Skin:  Negative for rash and wound.   Allergic/Immunologic: Negative for immunocompromised state.   Neurological:  Negative for dizziness, seizures, syncope, light-headedness, numbness and headaches.   Hematological:  Negative for adenopathy. Does not bruise/bleed easily.   Psychiatric/Behavioral:  Negative for behavioral problems and confusion. The patient is not nervous/anxious.        Objective   Physical Exam  Vitals reviewed.   Constitutional:       General: She is not in acute distress.     Appearance: She is well-developed. She  is not ill-appearing.   HENT:      Head: Normocephalic. No laceration. Hair is normal.      Right Ear: Hearing and ear canal normal.      Left Ear: Hearing and ear canal normal.      Nose: Nose normal.      Right Sinus: No maxillary sinus tenderness or frontal sinus tenderness.      Left Sinus: No maxillary sinus tenderness or frontal sinus tenderness.   Eyes:      General: Lids are normal.      Conjunctiva/sclera: Conjunctivae normal.      Pupils: Pupils are equal, round, and reactive to light.   Neck:      Thyroid: No thyroid mass or thyromegaly.      Vascular: No JVD.      Trachea: No tracheal tenderness or tracheal deviation.   Cardiovascular:      Rate and Rhythm: Normal rate and regular rhythm.      Heart sounds: No murmur heard.     No gallop.   Pulmonary:      Effort: Pulmonary effort is normal.      Breath sounds: Normal breath sounds. No stridor. No wheezing.   Chest:      Chest wall: No tenderness.   Abdominal:      General: Bowel sounds are normal. There is no distension.      Palpations: Abdomen is soft. There is no mass.      Tenderness: There is no abdominal tenderness. There is no guarding or rebound.      Hernia: No hernia is present.   Musculoskeletal:         General: No deformity.      Cervical back: Normal range of motion.   Lymphadenopathy:      Cervical: No cervical adenopathy.      Upper Body:      Right upper body: No supraclavicular adenopathy.      Left upper body: No supraclavicular adenopathy.   Skin:     General: Skin is warm and dry.      Coloration: Skin is not pale.      Findings: No erythema or rash.   Neurological:      Mental Status: She is alert and oriented to person, place, and time.      Motor: No abnormal muscle tone.   Psychiatric:         Behavior: Behavior normal.         Thought Content: Thought content normal.         Past Medical History:   Diagnosis Date    Celiac disease     Diverticulitis of colon     PONV (postoperative nausea and vomiting)        Family History    Problem Relation Age of Onset    Depression Mother     Depression Sister        Social History     Tobacco Use    Smoking status: Never    Smokeless tobacco: Never   Vaping Use    Vaping status: Never Used   Substance Use Topics    Alcohol use: Never    Drug use: Never       Past Surgical History:   Procedure Laterality Date    EXTRACORPOREAL SHOCK WAVE LITHOTRIPSY (ESWL) Right 6/28/2024    Procedure: EXTRACORPOREAL SHOCKWAVE LITHOTRIPSY;  Surgeon: Alcon Bowman MD;  Location: [facility];  Service: Urology;  Laterality: Right;    LAPAROSCOPIC CHOLECYSTECTOMY         Current Outpatient Medications   Medication Instructions    omeprazole (PRILOSEC) 40 mg, Daily    sucralfate (CARAFATE) 1 g, 4 Times Daily         Assessment & Plan   Diagnoses and all orders for this visit:    1. LLQ pain (Primary)    2. Diverticulitis of colon    colonoscopy             This document has been electronically signed by Karson Palacio MD   May 19, 2025 11:26 EDT

## 2025-05-30 ENCOUNTER — HOSPITAL ENCOUNTER (OUTPATIENT)
Facility: HOSPITAL | Age: 55
Setting detail: HOSPITAL OUTPATIENT SURGERY
Discharge: HOME | End: 2025-05-30
Attending: SURGERY | Admitting: SURGERY
Payer: COMMERCIAL

## 2025-05-30 ENCOUNTER — ANESTHESIA (OUTPATIENT)
Dept: PERIOP | Facility: HOSPITAL | Age: 55
End: 2025-05-30
Payer: COMMERCIAL

## 2025-05-30 ENCOUNTER — ANESTHESIA EVENT (OUTPATIENT)
Dept: PERIOP | Facility: HOSPITAL | Age: 55
End: 2025-05-30
Payer: COMMERCIAL

## 2025-05-30 VITALS
RESPIRATION RATE: 16 BRPM | HEART RATE: 72 BPM | DIASTOLIC BLOOD PRESSURE: 55 MMHG | WEIGHT: 160 LBS | OXYGEN SATURATION: 99 % | BODY MASS INDEX: 29.44 KG/M2 | SYSTOLIC BLOOD PRESSURE: 103 MMHG | TEMPERATURE: 97.1 F | HEIGHT: 62 IN

## 2025-05-30 PROCEDURE — 25810000003 LACTATED RINGERS PER 1000 ML: Performed by: ANESTHESIOLOGY

## 2025-05-30 PROCEDURE — 25010000002 PROPOFOL 200 MG/20ML EMULSION: Performed by: NURSE ANESTHETIST, CERTIFIED REGISTERED

## 2025-05-30 PROCEDURE — 81025 URINE PREGNANCY TEST: CPT | Performed by: SURGERY

## 2025-05-30 PROCEDURE — 45378 DIAGNOSTIC COLONOSCOPY: CPT | Performed by: SURGERY

## 2025-05-30 PROCEDURE — 25010000002 ONDANSETRON PER 1 MG: Performed by: NURSE ANESTHETIST, CERTIFIED REGISTERED

## 2025-05-30 PROCEDURE — 25010000002 MIDAZOLAM PER 1 MG: Performed by: NURSE ANESTHETIST, CERTIFIED REGISTERED

## 2025-05-30 RX ORDER — MIDAZOLAM HYDROCHLORIDE 1 MG/ML
1 INJECTION, SOLUTION INTRAMUSCULAR; INTRAVENOUS
Status: DISCONTINUED | OUTPATIENT
Start: 2025-05-30 | End: 2025-05-30 | Stop reason: HOSPADM

## 2025-05-30 RX ORDER — FENTANYL CITRATE 50 UG/ML
50 INJECTION, SOLUTION INTRAMUSCULAR; INTRAVENOUS
Status: DISCONTINUED | OUTPATIENT
Start: 2025-05-30 | End: 2025-05-30 | Stop reason: HOSPADM

## 2025-05-30 RX ORDER — OXYCODONE AND ACETAMINOPHEN 5; 325 MG/1; MG/1
1 TABLET ORAL ONCE AS NEEDED
Status: DISCONTINUED | OUTPATIENT
Start: 2025-05-30 | End: 2025-05-30 | Stop reason: HOSPADM

## 2025-05-30 RX ORDER — SODIUM CHLORIDE 9 MG/ML
40 INJECTION, SOLUTION INTRAVENOUS AS NEEDED
Status: DISCONTINUED | OUTPATIENT
Start: 2025-05-30 | End: 2025-05-30 | Stop reason: HOSPADM

## 2025-05-30 RX ORDER — IPRATROPIUM BROMIDE AND ALBUTEROL SULFATE 2.5; .5 MG/3ML; MG/3ML
3 SOLUTION RESPIRATORY (INHALATION) ONCE AS NEEDED
Status: DISCONTINUED | OUTPATIENT
Start: 2025-05-30 | End: 2025-05-30 | Stop reason: HOSPADM

## 2025-05-30 RX ORDER — MEPERIDINE HYDROCHLORIDE 25 MG/ML
12.5 INJECTION INTRAMUSCULAR; INTRAVENOUS; SUBCUTANEOUS
Status: DISCONTINUED | OUTPATIENT
Start: 2025-05-30 | End: 2025-05-30 | Stop reason: HOSPADM

## 2025-05-30 RX ORDER — ONDANSETRON 2 MG/ML
INJECTION INTRAMUSCULAR; INTRAVENOUS AS NEEDED
Status: DISCONTINUED | OUTPATIENT
Start: 2025-05-30 | End: 2025-05-30 | Stop reason: SURG

## 2025-05-30 RX ORDER — SODIUM CHLORIDE, SODIUM LACTATE, POTASSIUM CHLORIDE, CALCIUM CHLORIDE 600; 310; 30; 20 MG/100ML; MG/100ML; MG/100ML; MG/100ML
125 INJECTION, SOLUTION INTRAVENOUS ONCE
Status: COMPLETED | OUTPATIENT
Start: 2025-05-30 | End: 2025-05-30

## 2025-05-30 RX ORDER — MIDAZOLAM HYDROCHLORIDE 1 MG/ML
INJECTION, SOLUTION INTRAMUSCULAR; INTRAVENOUS AS NEEDED
Status: DISCONTINUED | OUTPATIENT
Start: 2025-05-30 | End: 2025-05-30 | Stop reason: SURG

## 2025-05-30 RX ORDER — SODIUM CHLORIDE 0.9 % (FLUSH) 0.9 %
10 SYRINGE (ML) INJECTION EVERY 12 HOURS SCHEDULED
Status: DISCONTINUED | OUTPATIENT
Start: 2025-05-30 | End: 2025-05-30 | Stop reason: HOSPADM

## 2025-05-30 RX ORDER — PROPOFOL 10 MG/ML
INJECTION, EMULSION INTRAVENOUS AS NEEDED
Status: DISCONTINUED | OUTPATIENT
Start: 2025-05-30 | End: 2025-05-30 | Stop reason: SURG

## 2025-05-30 RX ORDER — ONDANSETRON 2 MG/ML
4 INJECTION INTRAMUSCULAR; INTRAVENOUS AS NEEDED
Status: DISCONTINUED | OUTPATIENT
Start: 2025-05-30 | End: 2025-05-30 | Stop reason: HOSPADM

## 2025-05-30 RX ORDER — SODIUM CHLORIDE, SODIUM LACTATE, POTASSIUM CHLORIDE, CALCIUM CHLORIDE 600; 310; 30; 20 MG/100ML; MG/100ML; MG/100ML; MG/100ML
100 INJECTION, SOLUTION INTRAVENOUS ONCE AS NEEDED
Status: DISCONTINUED | OUTPATIENT
Start: 2025-05-30 | End: 2025-05-30 | Stop reason: HOSPADM

## 2025-05-30 RX ORDER — SODIUM CHLORIDE 0.9 % (FLUSH) 0.9 %
10 SYRINGE (ML) INJECTION AS NEEDED
Status: DISCONTINUED | OUTPATIENT
Start: 2025-05-30 | End: 2025-05-30 | Stop reason: HOSPADM

## 2025-05-30 RX ADMIN — PROPOFOL 100 MG: 10 INJECTION, EMULSION INTRAVENOUS at 09:30

## 2025-05-30 RX ADMIN — PROPOFOL 100 MG: 10 INJECTION, EMULSION INTRAVENOUS at 09:34

## 2025-05-30 RX ADMIN — PROPOFOL 100 MG: 10 INJECTION, EMULSION INTRAVENOUS at 09:26

## 2025-05-30 RX ADMIN — ONDANSETRON 4 MG: 2 INJECTION INTRAMUSCULAR; INTRAVENOUS at 09:28

## 2025-05-30 RX ADMIN — PROPOFOL 100 MG: 10 INJECTION, EMULSION INTRAVENOUS at 09:38

## 2025-05-30 RX ADMIN — MIDAZOLAM HYDROCHLORIDE 2 MG: 1 INJECTION, SOLUTION INTRAMUSCULAR; INTRAVENOUS at 09:23

## 2025-05-30 RX ADMIN — SODIUM CHLORIDE, POTASSIUM CHLORIDE, SODIUM LACTATE AND CALCIUM CHLORIDE: 600; 310; 30; 20 INJECTION, SOLUTION INTRAVENOUS at 09:23

## 2025-05-30 NOTE — ANESTHESIA POSTPROCEDURE EVALUATION
Patient: Dina Dias    Procedure Summary       Date: 05/30/25 Room / Location: [facility] OR [address] [place] [address] OR    Anesthesia Start: 0923 Anesthesia Stop: 0940    Procedure: COLONOSCOPY Diagnosis:       LLQ pain      Diverticulitis of colon      (LLQ pain [R10.32])      (Diverticulitis of colon [K57.32])    Surgeons: Karson Palacio MD Provider: Maxim Ashley MD    Anesthesia Type: general ASA Status: 3            Anesthesia Type: general    Vitals  Vitals Value Taken Time   /55 05/30/25 10:12   Temp 97.1 °F (36.2 °C) 05/30/25 09:43   Pulse 72 05/30/25 10:12   Resp 16 05/30/25 10:12   SpO2 99 % 05/30/25 10:12           Post Anesthesia Care and Evaluation    Patient location during evaluation: bedside  Patient participation: complete - patient participated  Level of consciousness: awake and alert  Pain score: 1  Pain management: adequate    Airway patency: patent  Anesthetic complications: No anesthetic complications  PONV Status: none  Cardiovascular status: acceptable  Respiratory status: acceptable  Hydration status: acceptable

## 2025-05-30 NOTE — ANESTHESIA PREPROCEDURE EVALUATION
Anesthesia Evaluation     Patient summary reviewed and Nursing notes reviewed   history of anesthetic complications:  PONV  NPO Solid Status: > 8 hours  NPO Liquid Status: > 8 hours           Airway   Mallampati: II  TM distance: >3 FB  Neck ROM: full  No difficulty expected  Dental    (+) poor dentition        Pulmonary - negative pulmonary ROS and normal exam    breath sounds clear to auscultation  (-) not a smoker  Cardiovascular - negative cardio ROS and normal exam  Exercise tolerance: good (4-7 METS)    NYHA Classification: II  Rhythm: regular  Rate: normal        Neuro/Psych- negative ROS  GI/Hepatic/Renal/Endo    (+) GERD, renal disease- stones    Musculoskeletal (-) negative ROS    Abdominal  - normal exam    Abdomen: soft.  Bowel sounds: normal.   Substance History - negative use     OB/GYN negative ob/gyn ROS         Other - negative ROS                     Anesthesia Plan    ASA 3     general     intravenous induction     Anesthetic plan, risks, benefits, and alternatives have been provided, discussed and informed consent has been obtained with: patient.  Pre-procedure education provided  Use of blood products discussed with  Consented to blood products.    Plan discussed with CRNA.      CODE STATUS:

## 2025-05-30 NOTE — ANESTHESIA POSTPROCEDURE EVALUATION
Patient: Dina Dias    Procedure Summary       Date: 05/30/25 Room / Location: [facility] OR [address] [place] [address] OR    Anesthesia Start: 0923 Anesthesia Stop: 0940    Procedure: COLONOSCOPY Diagnosis:       LLQ pain      Diverticulitis of colon      (LLQ pain [R10.32])      (Diverticulitis of colon [K57.32])    Surgeons: Karson Palacio MD Provider: Maxim Ashley MD    Anesthesia Type: general ASA Status: 3            Anesthesia Type: general    Vitals  Vitals Value Taken Time   /55 05/30/25 10:12   Temp 97.1 °F (36.2 °C) 05/30/25 09:43   Pulse 72 05/30/25 10:12   Resp 16 05/30/25 10:12   SpO2 99 % 05/30/25 10:12           Post Anesthesia Care and Evaluation    Patient location during evaluation: bedside  Patient participation: complete - patient participated  Level of consciousness: awake and alert  Pain score: 1  Pain management: adequate    Airway patency: patent  Anesthetic complications: No anesthetic complications  PONV Status: none  Cardiovascular status: acceptable  Respiratory status: acceptable  Hydration status: acceptable

## 2025-05-31 NOTE — OP NOTE
COLONOSCOPY  Procedure Note    Dina Dias  5/30/2025    Pre-op Diagnosis:   LLQ pain [R10.32]  Diverticulitis of colon [K57.32]    Post-op Diagnosis:   diverticulosis      Procedure(s):  COLONOSCOPY    Surgeon(s):  Karson Palacio MD    Anesthesia: General    Staff:   Circulator: Danielle Eden RN  Endo Technician: Mahendra Queen    Estimated Blood Loss: none    Specimens:                * No orders in the log *      Drains: * No LDAs found *    Procedure: The scope passed from the rectum to the cecum. The ileocecal valve and terminal ileum looked normal. The scope was slowly withdrawn and no inflammation seen. No polyps were seen. She does have extensive diverticulosis in the sigmoid and it is tortuous, but no inflammation. The rectum was unremarkable.     Findings: diverticulosis           Complications: none   Grafts / Implants N/A    Karson Palacio MD     Date: 5/31/2025  Time: 08:43 EDT

## 2025-06-05 ENCOUNTER — OFFICE VISIT (OUTPATIENT)
Dept: SURGERY | Facility: CLINIC | Age: 55
End: 2025-06-05
Payer: COMMERCIAL

## 2025-06-05 VITALS — BODY MASS INDEX: 29.11 KG/M2 | HEIGHT: 62 IN | WEIGHT: 158.2 LBS

## 2025-06-05 DIAGNOSIS — K57.90 DIVERTICULOSIS: ICD-10-CM

## 2025-06-05 DIAGNOSIS — R10.32 LLQ PAIN: Primary | ICD-10-CM

## 2025-06-05 PROCEDURE — 99213 OFFICE O/P EST LOW 20 MIN: CPT | Performed by: SURGERY

## 2025-06-05 NOTE — PROGRESS NOTES
Subjective   Dina Dias is a 55 y.o. female.     Chief Complaint: diverticulosis, LLQ pain    History of Present Illness She is a 56 yo who had a colonoscopy last week and has diverticulosis in the sigmoid with a tortuous colon. It was not inflamed. She had on other signs of inflammatory bowel disease or polyps, etc. She does say her left sided pain is a little better on antacids and carafate. She does have dairy and gluten intolerance.     The following portions of the patient's history were reviewed and updated as appropriate: current medications, past family history, past medical history, past social history, past surgical history and problem list.    Review of Systems    Objective   Physical Exam abdomen is soft with no masses, vague soreness on the left side     Past Medical History:   Diagnosis Date    Celiac disease     Diverticulitis of colon     GERD (gastroesophageal reflux disease)     PONV (postoperative nausea and vomiting)        Family History   Problem Relation Age of Onset    Depression Mother     Depression Sister        Social History     Tobacco Use    Smoking status: Never    Smokeless tobacco: Never   Vaping Use    Vaping status: Never Used   Substance Use Topics    Alcohol use: Never    Drug use: Never       Past Surgical History:   Procedure Laterality Date    COLONOSCOPY      COLONOSCOPY N/A 5/30/2025    Procedure: COLONOSCOPY;  Surgeon: Karson Palacio MD;  Location: [facility];  Service: Gastroenterology;  Laterality: N/A;    EXTRACORPOREAL SHOCK WAVE LITHOTRIPSY (ESWL) Right 06/28/2024    Procedure: EXTRACORPOREAL SHOCKWAVE LITHOTRIPSY;  Surgeon: Alcon Bowman MD;  Location: [facility];  Service: Urology;  Laterality: Right;    LAPAROSCOPIC CHOLECYSTECTOMY         Current Outpatient Medications   Medication Instructions    omeprazole (PRILOSEC) 40 mg, Daily    sodium-potassium-magnesium sulfates (Suprep Bowel Prep Kit) 17.5-3.13-1.6 GM/177ML solution oral solution Dispense  one Kit        Sig: Used as Directed    sucralfate (CARAFATE) 1 g, 4 Times Daily         Assessment & Plan   Diagnoses and all orders for this visit:    1. LLQ pain (Primary)    2. Diverticulosis    I discussed sigmoid resection, but I am not sure all of her symptoms are from the colon. So she is going to try probiotics and see if the symptoms are improved.   She is going to return if she decides to go ahead with surgery.           This document has been electronically signed by Karson Palacio MD   June 5, 2025 13:14 EDT

## (undated) DEVICE — Device

## (undated) DEVICE — ENDOGATOR AUXILIARY WATER JET CONNECTOR: Brand: ENDOGATOR

## (undated) DEVICE — DEFENDO AIR WATER SUCTION AND BIOPSY VALVE KIT FOR  OLYMPUS: Brand: DEFENDO AIR/WATER/SUCTION AND BIOPSY VALVE